# Patient Record
Sex: FEMALE | Race: WHITE
[De-identification: names, ages, dates, MRNs, and addresses within clinical notes are randomized per-mention and may not be internally consistent; named-entity substitution may affect disease eponyms.]

---

## 2017-04-21 ENCOUNTER — HOSPITAL ENCOUNTER (OUTPATIENT)
Dept: HOSPITAL 80 - BMCIMAGING | Age: 67
End: 2017-04-21
Attending: EMERGENCY MEDICINE
Payer: COMMERCIAL

## 2017-04-21 DIAGNOSIS — R05: Primary | ICD-10-CM

## 2017-04-21 DIAGNOSIS — R06.02: ICD-10-CM

## 2017-11-30 ENCOUNTER — HOSPITAL ENCOUNTER (OUTPATIENT)
Dept: HOSPITAL 80 - BMCIMAGING | Age: 67
End: 2017-11-30
Attending: INTERNAL MEDICINE
Payer: COMMERCIAL

## 2017-11-30 DIAGNOSIS — N28.1: ICD-10-CM

## 2017-11-30 DIAGNOSIS — R10.2: Primary | ICD-10-CM

## 2019-04-24 ENCOUNTER — HOSPITAL ENCOUNTER (INPATIENT)
Dept: HOSPITAL 80 - F1N | Age: 69
LOS: 5 days | Discharge: HOME | DRG: 330 | End: 2019-04-29
Attending: SURGERY | Admitting: SURGERY
Payer: COMMERCIAL

## 2019-04-24 DIAGNOSIS — Z53.31: ICD-10-CM

## 2019-04-24 DIAGNOSIS — F32.9: ICD-10-CM

## 2019-04-24 DIAGNOSIS — E87.5: ICD-10-CM

## 2019-04-24 DIAGNOSIS — Z87.442: ICD-10-CM

## 2019-04-24 DIAGNOSIS — C18.6: Primary | ICD-10-CM

## 2019-04-24 DIAGNOSIS — K56.50: ICD-10-CM

## 2019-04-24 DIAGNOSIS — Z87.891: ICD-10-CM

## 2019-04-24 LAB — PLATELET # BLD: 238 10^3/UL (ref 150–400)

## 2019-04-24 RX ADMIN — SODIUM CHLORIDE SCH MLS: 900 INJECTION, SOLUTION INTRAVENOUS at 18:33

## 2019-04-24 RX ADMIN — DOCUSATE SODIUM SCH MG: 100 CAPSULE, LIQUID FILLED ORAL at 23:35

## 2019-04-24 RX ADMIN — HYDROMORPHONE HCL-SODIUM CHLORIDE 0.9% INJ 6 MG/30ML PRN MG: 0.2 SOLUTION at 20:28

## 2019-04-24 NOTE — PDANEPAE
ANE History of Present Illness





sigmoid colectomy for colon Ca





ANE Past Medical History





- Cardiovascular History


Hx Hypertension: No


Hx Arrhythmias: No


Hx Chest Pain: No


Hx Coronary Artery / Peripheral Vascular Disease: No


Hx CHF / Valvular Disease: No


Hx Palpitations: No





- Pulmonary History


Hx COPD: No


Hx Asthma/Reactive Airway Disease: No


Hx Recent Upper Respiratory Infection: No


Hx Oxygen in Use at Home: No


Hx Sleep Apnea: No


Sleep Apnea Screening Result - Last Documented: Negative


Pulmonary History Comment: pneumonia one yr ago





- Neurologic History


Hx Cerebrovascular Accident: No


Hx Seizures: No


Hx Dementia: No





- Endocrine History


Hx Diabetes: No





- Renal History


Hx Renal Disorders: No


Renal History Comment: kidney stone





- Liver History


Hx Hepatic Disorders: No





- Neurological & Psychiatric Hx


Hx Neurological and Psychiatric Disorders: Yes


Neurological / Psychiatric History Comment: anxiety/depression





- Cancer History


Hx Cancer: Yes


Cancer History Comment: colon ca





- Congenital Disorder History


Hx Congenital Disorders: No





- GI History


Hx Gastrointestinal Disorders: No





- Other Health History


Other Health History: neg





- Chronic Pain History


Chronic Pain: No





- Surgical History


Prior Surgeries: c section.  colonoscopies x2





ANE Review of Systems


Review of Systems: 








- Exercise capacity


METS (RN): 4 METS





ANE Patient History





- Allergies


Allergies/Adverse Reactions: 








No Known Allergies Allergy (Verified 04/22/19 10:58)


 








- Home Medications


Home medications: home medication list seen and reviewed


Home Medications: 








FLUoxetine [Prozac 20 MG (*)] 80 mg PO DAILY 11/11/09 [Last Taken 1 Day Ago ~04/ 23/19]


ALPRAZolam [Xanax 0.25 MG (*)] 0.25 mg PO DAILY PRN 04/22/19 [Last Taken 04/24/ 19 08:30]


Ibuprofen [Motrin (*)] 200 mg PO DAILY PRN 04/22/19 [Last Taken 3 Days Ago ~04/ 21/19]








- NPO status


NPO Since - Liquids (Date): 04/24/19


NPO Since - Liquids (Time): 08:30


NPO Since - Solids (Date): 04/22/19


NPO Since - Solids (Time): 20:00





- Smoking Hx


Smoking Status: Former smoker





- Family Anes Hx


Family Hx Anesthesia Complications: neg





ANE Labs/Vital Signs





- Labs


Result Diagrams: 


 04/24/19 10:10





 04/24/19 10:10





- Vital Signs


Blood Pressure: 133/82


Heart Rate: 75


Respiratory Rate: 7


O2 Sat (%): 93


Height: 165.1 cm


Weight: 95.254 kg





ANE Physical Exam





- Airway


Neck exam: FROM


Mallampati Score: Class 2


Mouth exam: dentures





- Pulmonary


Pulmonary: no respiratory distress





- Cardiovascular


Cardiovascular: regular rate and rhythym





- ASA Status


ASA Status: II





ANE Anesthesia Plan


Anesthesia Plan: general endotracheal anesthesia


Regional Anesthesia: TAP block (at end if needed)

## 2019-04-24 NOTE — POSTOPPROG
Post Op Note


Date of Operation: 04/24/19


Surgeon: Nasir Santoro


Assistant: Kelsey Ocasio


Anesthesiologist: Emerson Castillo/ Markie Patel


Anesthesia: GET(General Endotracheal)


Pre-op Diagnosis: sigmoid colon mass


Post-op Diagnosis: same, but closer to descending colon


Procedure: ex laparoscopy, laparotomy c  L hemicolectomy


Findings: tumor marked and palpated. many adhesions. no obvious mets. large 

omentum


Inf/Abcess present in the surg proc area at time of surgery?: No


EBL: 


Bowel Protocol: Yes


Clean Closure Performed: Yes


Specimen(s): 


colon to pathology

## 2019-04-25 RX ADMIN — HYDROMORPHONE HCL-SODIUM CHLORIDE 0.9% INJ 6 MG/30ML PRN MG: 0.2 SOLUTION at 20:15

## 2019-04-25 RX ADMIN — SODIUM CHLORIDE SCH MLS: 900 INJECTION, SOLUTION INTRAVENOUS at 18:41

## 2019-04-25 RX ADMIN — OXYCODONE HYDROCHLORIDE AND ACETAMINOPHEN PRN TAB: 5; 325 TABLET ORAL at 12:31

## 2019-04-25 RX ADMIN — DOCUSATE SODIUM SCH MG: 100 CAPSULE, LIQUID FILLED ORAL at 09:31

## 2019-04-25 RX ADMIN — SODIUM CHLORIDE SCH MLS: 900 INJECTION, SOLUTION INTRAVENOUS at 02:38

## 2019-04-25 RX ADMIN — SODIUM CHLORIDE SCH MLS: 900 INJECTION, SOLUTION INTRAVENOUS at 10:19

## 2019-04-25 RX ADMIN — DOCUSATE SODIUM SCH MG: 100 CAPSULE, LIQUID FILLED ORAL at 22:04

## 2019-04-25 RX ADMIN — HEPARIN SODIUM SCH UNIT: 5000 INJECTION, SOLUTION INTRAVENOUS; SUBCUTANEOUS at 22:03

## 2019-04-25 NOTE — GOP
[f rep st]



                                                                OPERATIVE REPORT





DATE OF OPERATION:  04/24/2019



SURGEON:  Nasir Santoro MD



ASSISTANT:  POLINA Son.



ANESTHESIOLOGIST:  Dr. Martin.



PREOPERATIVE DIAGNOSIS:  Sigmoid colon cancer.



POSTOPERATIVE DIAGNOSIS:  Descending colon cancer.



PROCEDURE PERFORMED:  Laparoscopic left hemicolectomy with conversion to an open laparotomy.



FINDINGS:  Patient was found to have a nearly-obstructing colon cancer near the splenic flexure of th
e descending colon on the left.  Had a redundant sigmoid colon.  Had markedly thickened enlarged omen
glendy which was largely obscuring the splenic flexure and the colon cancer.





DESCRIPTION OF PROCEDURE:  The patient was taken to the operating room where she received a satisfact
ory general endotracheal anesthesia by Dr. Martin.  She was placed in a supine position in low stir
rups, prepped and draped in usual sterile fashion.  A supraumbilical incision was made.  A Veress nee
dle inserted.  Pneumoperitoneum was established.  Trocar was introduced.  Laparoscope introduced.  Go
od visualization was obtained.  Three other trocars were placed in the lower abdomen under direct vis
ion.  The sigmoid colon was mobilized by dividing the lateral peritoneal reflection and taking down a
 significant number of adhesions with the Harmonic Scalpel.  However, the tumor was not seen in the s
igmoid colon as advertised and was located further up underneath the thickened omentum in the descend
ing colon.  The Harmonic Scalpel was used to separate the omentum from the left colon, and the perito
sunni reflection was divided with the Harmonic Scalpel up around the splenic flexure.  However, it was
 still quite difficult to define the anatomy.  After a tedious amount of dissection, it was elected t
o make a midline incision for better mobilization of the colon.  This was carried down through the 
nicole alba.  The abdomen was entered.  The Lamonte wound retractor was placed.  The splenic flexure was 
further mobilized by retracting the descending and sigmoid colon medially and dividing the omentum of
f the transverse colon with the Harmonic Scalpel.  The left colon mesentery was then divided with the
 Harmonic Scalpel adjacent to the aorta with care to avoid injury to the ureter or the small intestin
e.  This was done with the Harmonic Scalpel.  The distal colon was divided with a FELISHA stapler as was 
the left transverse colon.  The specimen was removed and sent to Pathology, and confirmed to contain 
the cancerous mass.  End-to-side anastomosis was then made of the transverse colon to the proximal si
gmoid.  This was done in a 2-layer fashion with 3-0 silks for the anterior and posterior layers and a
 running inner layer of 3-0 Vicryl creating a good 2-fingerbreadth anastomosis.  The suture line was 
covered with some omental tissue with separate 3-0 silk sutures.  The wound was irrigated.  Hemostasi
s was assured.  A clean closure setup was used along with new gloves and gowns, and the wound was the
n closed using a running #1 PDS suture for the linea alba, a 3-0 Vicryl for the subcu, 4-0 Monocryl s
ubcuticular stitch for the skin.  All layers were infiltrated with 0.5% Marcaine.  Trocar sites were 
also closed with 4-0 Monocryl subcuticular stitch for the skin.  Wounds were infiltrated with 0.5% Ma
rcaine.  She tolerated the procedure well, was taken to the recovery room in good condition.  There w
ere no complications.





Job #:  [id number]

## 2019-04-25 NOTE — SOAPPROG
SOAP Progress Note


Assessment/Plan: 


Assessment/plan:





68 y/o F s/p lap conversion to open L hemicolectomy for CA POD #1


Path pending.


Cr and K elevated this am.  Will recheck later today.  Continue iv fluids.


Pulido.  Remove per protocol.


Continue pca for pain.


Continue NPO with small sips and ice chips until pt passes flatus.


Ambulate





Dispo: pending clinical course and return of bowel function.





S: Doing well overall.  Denies passing gas yet.  Pain controlled with pca.





O: Alert


Afebrile, VSS


RRR


No increased WOB


Abdomen: soft, nontender, nondistended, incision site well dressed.  Absent 

bowel sounds.





04/25/19 09:32





Objective: 





 Vital Signs











Temp Pulse Resp BP Pulse Ox


 


 37.2 C   89   17   112/71   91 L


 


 04/25/19 08:01  04/25/19 08:01  04/25/19 08:01  04/25/19 08:01  04/25/19 08:01








 Laboratory Results





 04/25/19 05:02 





 04/25/19 05:02 





 











 04/24/19 04/25/19 04/26/19





 05:59 05:59 05:59


 


Intake Total  4645 


 


Output Total  900 


 


Balance  3745 














ICD10 Worksheet


Patient Problems: 


 Problems











Problem Status Onset


 


Colon cancer Acute  














- ICD10 Problem Qualifiers


(1) Colon cancer

## 2019-04-25 NOTE — ASMTCMCOM
CM Note

 

CM Note                       

Notes:

Patient admitted for exploratory laparoscopy, laparotomy w L hemicolectomy. She is recovering well.



She lives alone and is normally independent. Son from [facility] coming in Saturday, 4/27 to help. She 

also has a friend who can assist if she is discharged before he arrives. I do not anticipate a need 


for home health but Case Management available if needs change.



Current CM Discharge plan: home independent

 

Date Signed:  04/25/2019 09:44 AM

Electronically Signed By:Karena López RN

## 2019-04-26 RX ADMIN — HYDROMORPHONE HYDROCHLORIDE PRN MG: 1 INJECTION, SOLUTION INTRAMUSCULAR; INTRAVENOUS; SUBCUTANEOUS at 18:01

## 2019-04-26 RX ADMIN — HYDROMORPHONE HYDROCHLORIDE PRN MG: 1 INJECTION, SOLUTION INTRAMUSCULAR; INTRAVENOUS; SUBCUTANEOUS at 22:57

## 2019-04-26 RX ADMIN — HEPARIN SODIUM SCH UNIT: 5000 INJECTION, SOLUTION INTRAVENOUS; SUBCUTANEOUS at 14:47

## 2019-04-26 RX ADMIN — DOCUSATE SODIUM SCH MG: 100 CAPSULE, LIQUID FILLED ORAL at 09:17

## 2019-04-26 RX ADMIN — DOCUSATE SODIUM SCH MG: 100 CAPSULE, LIQUID FILLED ORAL at 20:58

## 2019-04-26 RX ADMIN — HEPARIN SODIUM SCH: 5000 INJECTION, SOLUTION INTRAVENOUS; SUBCUTANEOUS at 21:46

## 2019-04-26 RX ADMIN — HYDROMORPHONE HYDROCHLORIDE PRN MG: 2 TABLET ORAL at 12:05

## 2019-04-26 RX ADMIN — SODIUM CHLORIDE SCH MLS: 900 INJECTION, SOLUTION INTRAVENOUS at 03:35

## 2019-04-26 RX ADMIN — HYDROMORPHONE HYDROCHLORIDE PRN MG: 2 TABLET ORAL at 20:58

## 2019-04-26 RX ADMIN — OXYCODONE HYDROCHLORIDE AND ACETAMINOPHEN PRN TAB: 5; 325 TABLET ORAL at 07:50

## 2019-04-26 RX ADMIN — HYDROMORPHONE HYDROCHLORIDE PRN MG: 2 TABLET ORAL at 16:51

## 2019-04-26 RX ADMIN — HEPARIN SODIUM SCH UNIT: 5000 INJECTION, SOLUTION INTRAVENOUS; SUBCUTANEOUS at 06:09

## 2019-04-26 NOTE — SOAPPROG
SOAP Progress Note


Assessment/Plan: 


Assessment/plan:





68 y/o F s/p lap conversion to open L hemicolectomy for CA POD #2


Path pending.


Cr and K elevated yesterday.  Improved today.


Pulido removed yesterday.  Uop adequate


Postop pain.  D/c pca today and transition to oral pain meds


Advance to clear liquid diet.


Continue to ambulate





Dispo: likely home over the weekend.





S: Pain controlled.  Denies passing gas.





O: Alert


Afebrile, VSS


RRR


No increased WOB


Abdomen: soft, nontender, nondistended.  Incision cdi.  Normoactive bowel 

sounds.








04/26/19 14:17





Objective: 





 Vital Signs











Temp Pulse Resp BP Pulse Ox


 


 36.6 C   77   16   115/67   88 L


 


 04/26/19 11:32  04/26/19 11:32  04/26/19 11:32  04/26/19 11:32  04/26/19 11:32








 Laboratory Results





 04/25/19 05:02 





 04/26/19 08:00 





 











 04/25/19 04/26/19 04/27/19





 05:59 05:59 05:59


 


Intake Total 4645  2000


 


Output Total 900 1350 400


 


Balance 3745 -1350 1600














ICD10 Worksheet


Patient Problems: 


 Problems











Problem Status Onset


 


Colon cancer Acute  














- ICD10 Problem Qualifiers


(1) Colon cancer

## 2019-04-27 RX ADMIN — OXYCODONE HYDROCHLORIDE AND ACETAMINOPHEN PRN TAB: 5; 325 TABLET ORAL at 22:27

## 2019-04-27 RX ADMIN — OXYCODONE HYDROCHLORIDE AND ACETAMINOPHEN PRN TAB: 5; 325 TABLET ORAL at 17:42

## 2019-04-27 RX ADMIN — HEPARIN SODIUM SCH UNIT: 5000 INJECTION, SOLUTION INTRAVENOUS; SUBCUTANEOUS at 15:47

## 2019-04-27 RX ADMIN — HEPARIN SODIUM SCH: 5000 INJECTION, SOLUTION INTRAVENOUS; SUBCUTANEOUS at 22:33

## 2019-04-27 RX ADMIN — HEPARIN SODIUM SCH UNIT: 5000 INJECTION, SOLUTION INTRAVENOUS; SUBCUTANEOUS at 06:33

## 2019-04-27 RX ADMIN — OXYCODONE HYDROCHLORIDE AND ACETAMINOPHEN PRN TAB: 5; 325 TABLET ORAL at 10:01

## 2019-04-27 RX ADMIN — DOCUSATE SODIUM SCH MG: 100 CAPSULE, LIQUID FILLED ORAL at 07:57

## 2019-04-27 RX ADMIN — DOCUSATE SODIUM SCH MG: 100 CAPSULE, LIQUID FILLED ORAL at 22:28

## 2019-04-27 RX ADMIN — HEPARIN SODIUM SCH: 5000 INJECTION, SOLUTION INTRAVENOUS; SUBCUTANEOUS at 15:48

## 2019-04-27 RX ADMIN — HYDROMORPHONE HYDROCHLORIDE PRN MG: 2 TABLET ORAL at 01:08

## 2019-04-27 NOTE — SOAPPROG
SOAP Progress Note


Assessment/Plan: 


Assessment:


DOING WELL/PATH PENDING/MINIMAL FLATUS/AFEBRILE


ABDOMEN SOFT/WOUND OKAY/POSITIVE BOWEL SOUNDS/TOLERATING CLEARS























Plan:  HOPEFULLY ADVANCE DIET SOON





04/27/19 13:08





Objective: 





 Vital Signs











Temp Pulse Resp BP Pulse Ox


 


 36.9 C   83   16   146/77 H  90 L


 


 04/27/19 12:00  04/27/19 12:00  04/27/19 12:00  04/27/19 12:00  04/27/19 12:00








 Laboratory Results





 04/25/19 05:02 





 04/26/19 08:00 





 











 04/26/19 04/27/19 04/28/19





 05:59 05:59 05:59


 


Intake Total  3480 


 


Output Total 1350 2000 


 


Balance -1350 1480 














ICD10 Worksheet


Patient Problems: 


 Problems











Problem Status Onset


 


Colon cancer Acute

## 2019-04-28 RX ADMIN — HEPARIN SODIUM SCH: 5000 INJECTION, SOLUTION INTRAVENOUS; SUBCUTANEOUS at 19:50

## 2019-04-28 RX ADMIN — HYDROMORPHONE HYDROCHLORIDE PRN MG: 2 TABLET ORAL at 08:03

## 2019-04-28 RX ADMIN — DOCUSATE SODIUM SCH MG: 100 CAPSULE, LIQUID FILLED ORAL at 08:05

## 2019-04-28 RX ADMIN — HEPARIN SODIUM SCH: 5000 INJECTION, SOLUTION INTRAVENOUS; SUBCUTANEOUS at 15:38

## 2019-04-28 RX ADMIN — HEPARIN SODIUM SCH: 5000 INJECTION, SOLUTION INTRAVENOUS; SUBCUTANEOUS at 05:59

## 2019-04-28 RX ADMIN — OXYCODONE HYDROCHLORIDE AND ACETAMINOPHEN PRN TAB: 5; 325 TABLET ORAL at 17:03

## 2019-04-29 VITALS — DIASTOLIC BLOOD PRESSURE: 67 MMHG | SYSTOLIC BLOOD PRESSURE: 112 MMHG

## 2019-04-29 RX ADMIN — HEPARIN SODIUM SCH: 5000 INJECTION, SOLUTION INTRAVENOUS; SUBCUTANEOUS at 06:45

## 2019-04-29 RX ADMIN — DOCUSATE SODIUM SCH: 100 CAPSULE, LIQUID FILLED ORAL at 05:00

## 2019-04-29 RX ADMIN — DOCUSATE SODIUM SCH MG: 100 CAPSULE, LIQUID FILLED ORAL at 08:02

## 2019-04-29 RX ADMIN — HYDROMORPHONE HYDROCHLORIDE PRN MG: 2 TABLET ORAL at 08:01

## 2019-04-29 NOTE — ASMTLACE
LACE

 

Length of stay for            Answers:  4-6 days                              

current admission                                                             

Acuity / Level of             Answers:  Yes                                   

Care: Did the patient                                                         

have an inpatient                                                             

admission?                                                                    

Comorbidities - select        Answers:  Any tumor (including                  

all that apply                          lymphoma or leukemia)                 

# of Emergency department     Answers:  0                                     

visits in the last 6                                                          

months                                                                        

Social determinants           Answers:  Mental health diagnosis               

                                        (anxiety, depression, pers            

                                        onality disorders, etc.)              

Score: 12

 

Date Signed:  04/29/2019 08:23 AM

Electronically Signed By:Yue Jeter

## 2019-04-29 NOTE — ASMTCMCOM
CM Note

 

CM Note                       

Notes:

Met with Pt who is recovering from a left hemicolectomy for a descending colon cancer in which 

pathology report is pending.  Pt is improving. Inge will discharge home this morning with her 

son. CM available for needs prior to discharge.



PLAN: Home independently with Son

 

Date Signed:  04/29/2019 08:21 AM

Electronically Signed By:Yue Jeter

## 2019-04-29 NOTE — SOAPPROG
SOAP Progress Note


Assessment/Plan: 


Assessment/Plan: 69 Y F s/p laparotomy c L hemicolectomy.





D/c to home. 


Limitations discussed. 





S: passing gas, pain pills working, had BM


O:


alert, nad


ctab


rrr


abd soft, inc cdi, +BS





04/29/19 07:50





Objective: 





 Vital Signs











Temp Pulse Resp BP Pulse Ox


 


 36.4 C   75   18   112/67   90 L


 


 04/29/19 07:44  04/29/19 07:44  04/29/19 07:44  04/29/19 07:44  04/29/19 07:44








 Laboratory Results





 04/25/19 05:02 





 04/26/19 08:00 





 











 04/28/19 04/29/19 04/30/19





 05:59 05:59 05:59


 


Intake Total 2400  


 


Output Total 1800 800 


 


Balance 600 -800 














ICD10 Worksheet


Patient Problems: 


 Problems











Problem Status Onset


 


Colon cancer Acute

## 2019-05-13 NOTE — GDS
[f rep st]



                                                             DISCHARGE SUMMARY





DISCHARGE DIAGNOSIS:  Descending colon cancer.



PROCEDURE PERFORMED:  Laparoscopic left hemicolectomy with conversion to open laparotomy.



INTRAOPERATIVE FINDINGS:  The patient was found to have a nearly obstructing colon cancer near the sp
lenic flexure of the descending colon.  She had a redundant sigmoid colon.  She also had markedly thi
ckened and large omentum, which was largely obscuring the splenic flexure and the colon cancer.



SPECIAL TESTS:  None.



CONSULTATIONS:  None.



HOSPITAL COURSE:  Inge is a 69-year-old female who was found to have a left-sided colon cancer and u
nderwent hemicolectomy as described above.  The procedure was uncomplicated and she tolerated it well
.  The patient's postoperative course was relatively uneventful.  Her Pulido catheter was removed on p
ostoperative day 1 and she was able to void spontaneously.  Her pain was initially controlled with a 
PCA and then she was transitioned to oral pain meds as her diet was advanced.  She did have some elev
ation of creatinine and some hyperkalemia, which was observed with repeat labs and had improved.  [place] grant was pending at time of discharge.



DISCHARGE INSTRUCTIONS:  The patient was discharged to home in stable condition with plans for outpat
ient followup.  Limitations were discussed.  She understands she will eventually need to see her onco
logist as well.





Job #:  [id number]

## 2019-06-13 ENCOUNTER — HOSPITAL ENCOUNTER (OUTPATIENT)
Dept: HOSPITAL 80 - FIMAGING | Age: 69
End: 2019-06-13
Payer: COMMERCIAL

## 2019-06-20 ENCOUNTER — HOSPITAL ENCOUNTER (OUTPATIENT)
Dept: HOSPITAL 80 - FIMAGING | Age: 69
End: 2019-06-20
Payer: COMMERCIAL